# Patient Record
Sex: FEMALE | Race: ASIAN | NOT HISPANIC OR LATINO | URBAN - METROPOLITAN AREA
[De-identification: names, ages, dates, MRNs, and addresses within clinical notes are randomized per-mention and may not be internally consistent; named-entity substitution may affect disease eponyms.]

---

## 2019-11-25 ENCOUNTER — EMERGENCY (EMERGENCY)
Facility: HOSPITAL | Age: 49
LOS: 1 days | Discharge: HOME | End: 2019-11-25
Attending: EMERGENCY MEDICINE | Admitting: EMERGENCY MEDICINE
Payer: MEDICAID

## 2019-11-25 VITALS
HEART RATE: 71 BPM | DIASTOLIC BLOOD PRESSURE: 76 MMHG | SYSTOLIC BLOOD PRESSURE: 127 MMHG | TEMPERATURE: 98 F | WEIGHT: 119.05 LBS | OXYGEN SATURATION: 100 % | RESPIRATION RATE: 16 BRPM

## 2019-11-25 PROCEDURE — 99284 EMERGENCY DEPT VISIT MOD MDM: CPT

## 2019-11-25 NOTE — ED ADULT TRIAGE NOTE - CHIEF COMPLAINT QUOTE
Ambulatory from home, Turkish speaking, sister Maggy acting as , complaining of CP that started yesterday, worse on inspiration. Patient thought that it was gas related yesterday and took a family member's omeprazole and some motrin without any relief. Patient went to  today and was told to come to the ED without getting an EKG or blood work. VSS. EKG in progress. Denies long car rides/air travel, PMH.

## 2019-11-26 VITALS
SYSTOLIC BLOOD PRESSURE: 102 MMHG | RESPIRATION RATE: 18 BRPM | HEART RATE: 62 BPM | OXYGEN SATURATION: 100 % | DIASTOLIC BLOOD PRESSURE: 71 MMHG | TEMPERATURE: 98 F

## 2019-11-26 DIAGNOSIS — Z90.49 ACQUIRED ABSENCE OF OTHER SPECIFIED PARTS OF DIGESTIVE TRACT: Chronic | ICD-10-CM

## 2019-11-26 LAB
ALBUMIN SERPL ELPH-MCNC: 4.1 G/DL — SIGNIFICANT CHANGE UP (ref 3.3–5)
ALP SERPL-CCNC: 86 U/L — SIGNIFICANT CHANGE UP (ref 40–120)
ALT FLD-CCNC: 20 U/L — SIGNIFICANT CHANGE UP (ref 4–33)
ANION GAP SERPL CALC-SCNC: 10 MMO/L — SIGNIFICANT CHANGE UP (ref 7–14)
APPEARANCE UR: CLEAR — SIGNIFICANT CHANGE UP
AST SERPL-CCNC: 17 U/L — SIGNIFICANT CHANGE UP (ref 4–32)
BASOPHILS # BLD AUTO: 0.1 K/UL — SIGNIFICANT CHANGE UP (ref 0–0.2)
BASOPHILS NFR BLD AUTO: 1 % — SIGNIFICANT CHANGE UP (ref 0–2)
BILIRUB SERPL-MCNC: < 0.2 MG/DL — LOW (ref 0.2–1.2)
BILIRUB UR-MCNC: NEGATIVE — SIGNIFICANT CHANGE UP
BLOOD UR QL VISUAL: NEGATIVE — SIGNIFICANT CHANGE UP
BUN SERPL-MCNC: 10 MG/DL — SIGNIFICANT CHANGE UP (ref 7–23)
CALCIUM SERPL-MCNC: 9.3 MG/DL — SIGNIFICANT CHANGE UP (ref 8.4–10.5)
CHLORIDE SERPL-SCNC: 105 MMOL/L — SIGNIFICANT CHANGE UP (ref 98–107)
CO2 SERPL-SCNC: 24 MMOL/L — SIGNIFICANT CHANGE UP (ref 22–31)
COLOR SPEC: SIGNIFICANT CHANGE UP
CREAT SERPL-MCNC: 0.74 MG/DL — SIGNIFICANT CHANGE UP (ref 0.5–1.3)
D DIMER BLD IA.RAPID-MCNC: < 150 NG/ML — SIGNIFICANT CHANGE UP
EOSINOPHIL # BLD AUTO: 1.47 K/UL — HIGH (ref 0–0.5)
EOSINOPHIL NFR BLD AUTO: 14.4 % — HIGH (ref 0–6)
GLUCOSE SERPL-MCNC: 100 MG/DL — HIGH (ref 70–99)
GLUCOSE UR-MCNC: NEGATIVE — SIGNIFICANT CHANGE UP
HCT VFR BLD CALC: 36.6 % — SIGNIFICANT CHANGE UP (ref 34.5–45)
HGB BLD-MCNC: 11.4 G/DL — LOW (ref 11.5–15.5)
IMM GRANULOCYTES NFR BLD AUTO: 0.1 % — SIGNIFICANT CHANGE UP (ref 0–1.5)
KETONES UR-MCNC: NEGATIVE — SIGNIFICANT CHANGE UP
LEUKOCYTE ESTERASE UR-ACNC: NEGATIVE — SIGNIFICANT CHANGE UP
LYMPHOCYTES # BLD AUTO: 3.44 K/UL — HIGH (ref 1–3.3)
LYMPHOCYTES # BLD AUTO: 33.6 % — SIGNIFICANT CHANGE UP (ref 13–44)
MCHC RBC-ENTMCNC: 26.4 PG — LOW (ref 27–34)
MCHC RBC-ENTMCNC: 31.1 % — LOW (ref 32–36)
MCV RBC AUTO: 84.7 FL — SIGNIFICANT CHANGE UP (ref 80–100)
MONOCYTES # BLD AUTO: 0.87 K/UL — SIGNIFICANT CHANGE UP (ref 0–0.9)
MONOCYTES NFR BLD AUTO: 8.5 % — SIGNIFICANT CHANGE UP (ref 2–14)
NEUTROPHILS # BLD AUTO: 4.34 K/UL — SIGNIFICANT CHANGE UP (ref 1.8–7.4)
NEUTROPHILS NFR BLD AUTO: 42.4 % — LOW (ref 43–77)
NITRITE UR-MCNC: NEGATIVE — SIGNIFICANT CHANGE UP
NRBC # FLD: 0 K/UL — SIGNIFICANT CHANGE UP (ref 0–0)
NT-PROBNP SERPL-SCNC: 41.48 PG/ML — SIGNIFICANT CHANGE UP
PH UR: 6.5 — SIGNIFICANT CHANGE UP (ref 5–8)
PLATELET # BLD AUTO: 362 K/UL — SIGNIFICANT CHANGE UP (ref 150–400)
PMV BLD: 10.4 FL — SIGNIFICANT CHANGE UP (ref 7–13)
POTASSIUM SERPL-MCNC: 4.2 MMOL/L — SIGNIFICANT CHANGE UP (ref 3.5–5.3)
POTASSIUM SERPL-SCNC: 4.2 MMOL/L — SIGNIFICANT CHANGE UP (ref 3.5–5.3)
PROT SERPL-MCNC: 7.4 G/DL — SIGNIFICANT CHANGE UP (ref 6–8.3)
PROT UR-MCNC: NEGATIVE — SIGNIFICANT CHANGE UP
RBC # BLD: 4.32 M/UL — SIGNIFICANT CHANGE UP (ref 3.8–5.2)
RBC # FLD: 13 % — SIGNIFICANT CHANGE UP (ref 10.3–14.5)
SODIUM SERPL-SCNC: 139 MMOL/L — SIGNIFICANT CHANGE UP (ref 135–145)
SP GR SPEC: 1.01 — SIGNIFICANT CHANGE UP (ref 1–1.04)
TROPONIN T, HIGH SENSITIVITY: < 6 NG/L — SIGNIFICANT CHANGE UP (ref ?–14)
UROBILINOGEN FLD QL: NORMAL — SIGNIFICANT CHANGE UP
WBC # BLD: 10.23 K/UL — SIGNIFICANT CHANGE UP (ref 3.8–10.5)
WBC # FLD AUTO: 10.23 K/UL — SIGNIFICANT CHANGE UP (ref 3.8–10.5)

## 2019-11-26 PROCEDURE — 71046 X-RAY EXAM CHEST 2 VIEWS: CPT | Mod: 26

## 2019-11-26 RX ORDER — IBUPROFEN 200 MG
400 TABLET ORAL ONCE
Refills: 0 | Status: COMPLETED | OUTPATIENT
Start: 2019-11-26 | End: 2019-11-26

## 2019-11-26 RX ORDER — ASPIRIN/CALCIUM CARB/MAGNESIUM 324 MG
324 TABLET ORAL ONCE
Refills: 0 | Status: COMPLETED | OUTPATIENT
Start: 2019-11-26 | End: 2019-11-26

## 2019-11-26 RX ORDER — ASPIRIN/CALCIUM CARB/MAGNESIUM 324 MG
324 TABLET ORAL DAILY
Refills: 0 | Status: DISCONTINUED | OUTPATIENT
Start: 2019-11-26 | End: 2019-11-26

## 2019-11-26 RX ADMIN — Medication 324 MILLIGRAM(S): at 02:38

## 2019-11-26 RX ADMIN — Medication 400 MILLIGRAM(S): at 04:29

## 2019-11-26 NOTE — ED PROVIDER NOTE - PATIENT PORTAL LINK FT
You can access the FollowMyHealth Patient Portal offered by [facility] by registering at the following website: [email or web address]. By joining [employer]’s FollowMyHealth portal, you will also be able to view your health information using other applications (apps) compatible with our system.

## 2019-11-26 NOTE — ED PROVIDER NOTE - OBJECTIVE STATEMENT
49y/o F with no known PMH presents for CP since night prior. Is mid/L sided chest w/o radiation, no diaphoresis/lightheadness, never had this pain before. Thought it was gas pain so took some meds but this didn't improve. Persisting today so presented to ED. Worse with deep breaths and twisting movement, not a/w SOB, fever, cough/cold sxs. No recent travel[place], sees her PCP regularly.

## 2019-11-26 NOTE — ED PROVIDER NOTE - CLINICAL SUMMARY MEDICAL DECISION MAKING FREE TEXT BOX
49y/o Wolof F with no PMH presents for chest wall pain that is reproducible and pleuritic. Differential r/o ACS vs infectious cause vs MSK/costochondritis. Will get labs, EKG, CXR, trop. Likely Dispo home if eval neg 47y/o Czech F with no PMH presents for chest wall pain that is reproducible and pleuritic. Differential r/o ACS vs infectious cause vs MSK/costochondritis. Will get labs, EKG, CXR, trop, D-dimer. HEART 0, Wells 0, PERC 1 for age Likely Dispo home if eval neg

## 2019-11-26 NOTE — ED ADULT NURSE NOTE - OBJECTIVE STATEMENT
Facilitator RN - Pt. received in room 8, A&Ox4, ambulatory. Pt. primarily Estonian speaking, declines translation services at present and requesting family at bedside to translate. Pt. c/o midsternal/left sided chest pain that began last night. Worsening when taking a deep breath. Denies radiation of pain to other areas, n/v/d, abd pain, dizziness, weakness, fever, chills. 20 gauge IV inserted in right ac, positive blood return, flushes without difficulty. Respirations even & unlabored on room air. Placed on cardiac monitor. Report given to primary RN Rosa Maria.

## 2019-11-26 NOTE — ED PROVIDER NOTE - PHYSICAL EXAMINATION
PHYSICAL EXAM:  GENERAL: NAD, well-developed  F comfortable but sitting upright in stretcher  HEENT:  Atraumatic, Normocephalic; EOMI, PERRLA, conjunctiva pink, sclera white, Supple, No JVD appreciated  CHEST/LUNG: Clear to auscultation bilaterally; No wheeze  HEART: Regular rate and rhythm; No murmurs; chest pain reproduced on palpation over L sternal border and chest wall  ABDOMEN: Soft, Nontender, Nondistended; Bowel sounds present  EXTREMITIES:  No peripheral pitting edema  PSYCH: AAOx3  SKIN: No rashes/lesions appreciated

## 2019-11-26 NOTE — ED ADULT NURSE NOTE - CHIEF COMPLAINT QUOTE
Ambulatory from home, Slovenian speaking, sister Maggy acting as , complaining of CP that started yesterday, worse on inspiration. Patient thought that it was gas related yesterday and took a family member's omeprazole and some motrin without any relief. Patient went to  today and was told to come to the ED without getting an EKG or blood work. VSS. EKG in progress. Denies long car rides/air travel, PMH.

## 2019-11-26 NOTE — ED PROVIDER NOTE - ATTENDING CONTRIBUTION TO CARE
DR. TROY, ATTENDING MD-  I performed a face to face bedside interview with the patient regarding history of present illness, review of symptoms and past medical history. I completed an independent physical exam.  I have discussed the patient's plan of care with the resident.   Documentation as above in the note.    47 y/o female with cp.  Denies f/c, ha, neck stiffness, sob, cough, abd pain, n/v/d, dysuria, rash.  Afebrile, vs wnl, nad, ctabil, s1s2 rrr no m/r/g, abd soft non ttp no r/g, no cva tenderness b/l, no leg swelling b/l, no rash.  Pre-trop HEART score 1 for hx.  Low risk by wells, cannot perc out d/t age.  Obtain cbc, bmp, trop, dimer, cxr, ekg, reassess, antic dc with cards f/u as o/p.

## 2019-11-26 NOTE — ED PROVIDER NOTE - NSFOLLOWUPINSTRUCTIONS_ED_ALL_ED_FT
You presented with chest pain that was worse with movement and pressing over area. You had a workup for cardiac cause that was negative. Xray of chest did not show a reason for your pain. The pain may be from muscles of chest wall or gas pain from stomach.     1) Recommend tylenol/motrin or similar medications for pain  2) If pain worsens, spreads, you feel weak or lightheaded seek immediate medical care  3) Follow up with your primary care doctor on discharge.

## 2020-01-07 ENCOUNTER — EMERGENCY (EMERGENCY)
Facility: HOSPITAL | Age: 50
LOS: 1 days | Discharge: HOME | End: 2020-01-07
Attending: EMERGENCY MEDICINE | Admitting: EMERGENCY MEDICINE
Payer: MEDICAID

## 2020-01-07 VITALS
DIASTOLIC BLOOD PRESSURE: 82 MMHG | HEART RATE: 76 BPM | OXYGEN SATURATION: 100 % | TEMPERATURE: 100 F | RESPIRATION RATE: 16 BRPM | SYSTOLIC BLOOD PRESSURE: 140 MMHG

## 2020-01-07 DIAGNOSIS — Z90.49 ACQUIRED ABSENCE OF OTHER SPECIFIED PARTS OF DIGESTIVE TRACT: Chronic | ICD-10-CM

## 2020-01-07 PROCEDURE — 12001 RPR S/N/AX/GEN/TRNK 2.5CM/<: CPT | Mod: F6

## 2020-01-07 PROCEDURE — 99282 EMERGENCY DEPT VISIT SF MDM: CPT | Mod: 25

## 2020-01-07 RX ORDER — TETANUS TOXOID, REDUCED DIPHTHERIA TOXOID AND ACELLULAR PERTUSSIS VACCINE, ADSORBED 5; 2.5; 8; 8; 2.5 [IU]/.5ML; [IU]/.5ML; UG/.5ML; UG/.5ML; UG/.5ML
0.5 SUSPENSION INTRAMUSCULAR ONCE
Refills: 0 | Status: COMPLETED | OUTPATIENT
Start: 2020-01-07 | End: 2020-01-07

## 2020-01-07 RX ADMIN — TETANUS TOXOID, REDUCED DIPHTHERIA TOXOID AND ACELLULAR PERTUSSIS VACCINE, ADSORBED 0.5 MILLILITER(S): 5; 2.5; 8; 8; 2.5 SUSPENSION INTRAMUSCULAR at 22:46

## 2020-01-07 NOTE — ED PROVIDER NOTE - OBJECTIVE STATEMENT
48 y/o f presents to the ed with laceration to finger. Patient was using a knife, missed and sliced across her finger instead (2nd r. digit). Bled at time, put pressure on it and came here. No numbness, tingling or weakness to finger. No difficulty ranging at joints. No fevers, chills, ha, cp, cough, sob, abd pain, vomiting, diarrhea, dysuria.

## 2020-01-07 NOTE — ED PROVIDER NOTE - NS ED ROS FT
ROS:  GENERAL: No fever, no chills  EYES: no change in vision  HEENT: no trouble swallowing, no trouble speaking  CARDIAC: no chest pain  PULMONARY: no cough, no shortness of breath  GI: no abdominal pain, no nausea, no vomiting, no diarrhea, no constipation  : No dysuria, no frequency, no change in appearance, or odor of urine  SKIN: +laceration, r. 2nd digit  NEURO: no headache, no weakness  MSK: No joint pain

## 2020-01-07 NOTE — ED PROVIDER NOTE - CLINICAL SUMMARY MEDICAL DECISION MAKING FREE TEXT BOX
Patient presents to the ed with lac to 2nd r. digit, nvi, no other complaints, not utd on tetanus-will irrigate, repair, adacel, return precautions discussed.

## 2020-01-07 NOTE — ED ADULT TRIAGE NOTE - CHIEF COMPLAINT QUOTE
alert Japanese speaking refused  daughter will translate no distress has laceration on right index finger   cut it with a knife  no medical hx

## 2020-01-07 NOTE — ED PROVIDER NOTE - PHYSICAL EXAMINATION
A & O x 3, NAD, HEENT WNL and no facial asymmetry; lungs CTAB, heart with reg rhythm without murmur; abdomen soft NTND; extremities with no edema; skin with 1 cm laceration to medial aspect of proximal 2nd digit, no fb visualized, cap refill <2s, nvi, from at joints. neuro exam non focal with no motor or sensory deficits.

## 2020-01-07 NOTE — ED PROVIDER NOTE - PATIENT PORTAL LINK FT
You can access the FollowMyHealth Patient Portal offered by [facility] by registering at the following website: [email or web address]. By joining [employer]’s FollowMyHealth portal, you will also be able to view your health information using other applications (apps) compatible with our system.

## 2020-01-07 NOTE — ED ADULT NURSE NOTE - OBJECTIVE STATEMENT
PT received to the ED presenting with laceration on 2nd digit on rt hand. Pt reports cutting finger with knife while cooking, laceration performed by JASS Bazzi, will continue to monitor.

## 2020-01-07 NOTE — ED ADULT NURSE NOTE - CHIEF COMPLAINT QUOTE
alert Pashto speaking refused  daughter will translate no distress has laceration on right index finger   cut it with a knife  no medical hx

## 2020-01-07 NOTE — ED PROVIDER NOTE - NSFOLLOWUPINSTRUCTIONS_ED_ALL_ED_FT
Follow up with your PMD within 48-72 hours for wound check. Keep sutures covered and dry for 24 hours then clean with soap and water daily.  Apply bacitracin and cover. Avoid heavy lifting. Return to ED for suture removal 8-10 days. Any increased pain, redness, streaking (red lines), swelling, fever, chills return to ER.

## 2020-01-07 NOTE — ED PROVIDER NOTE - PROGRESS NOTE DETAILS
JASS PATEL: pt signed out to me by Dr. Patiño for laceration repair. Laceration repaired. Pt is medically stable for discharge and follow up with PMD. The patient was given verbal and written discharge instructions. Specifically, instructions when to return to the ED and when to seek follow-up from their pcp was discussed. Any specialty follow-up was discussed, including how to make an appointment.  Instructions were discussed in simple, plain language and was understood by the patient. The patient understands that should their symptoms worsen or any new symptoms arise, they should return to the ED immediately for further evaluation. All pt's questions were answered. Patient verbalizes understanding.

## 2020-01-18 ENCOUNTER — EMERGENCY (EMERGENCY)
Facility: HOSPITAL | Age: 50
LOS: 1 days | Discharge: HOME | End: 2020-01-18
Attending: EMERGENCY MEDICINE | Admitting: EMERGENCY MEDICINE
Payer: MEDICAID

## 2020-01-18 VITALS
SYSTOLIC BLOOD PRESSURE: 129 MMHG | DIASTOLIC BLOOD PRESSURE: 72 MMHG | HEART RATE: 68 BPM | TEMPERATURE: 98 F | RESPIRATION RATE: 16 BRPM | OXYGEN SATURATION: 100 %

## 2020-01-18 DIAGNOSIS — Z90.49 ACQUIRED ABSENCE OF OTHER SPECIFIED PARTS OF DIGESTIVE TRACT: Chronic | ICD-10-CM

## 2020-01-18 PROCEDURE — 73140 X-RAY EXAM OF FINGER(S): CPT | Mod: 26,RT

## 2020-01-18 PROCEDURE — 99283 EMERGENCY DEPT VISIT LOW MDM: CPT

## 2020-01-18 RX ORDER — CEPHALEXIN 500 MG
1 CAPSULE ORAL
Qty: 28 | Refills: 0
Start: 2020-01-18 | End: 2020-01-24

## 2020-01-18 RX ORDER — IBUPROFEN 200 MG
600 TABLET ORAL ONCE
Refills: 0 | Status: COMPLETED | OUTPATIENT
Start: 2020-01-18 | End: 2020-01-18

## 2020-01-18 RX ADMIN — Medication 600 MILLIGRAM(S): at 22:36

## 2020-01-18 NOTE — ED PROVIDER NOTE - CLINICAL SUMMARY MEDICAL DECISION MAKING FREE TEXT BOX
This is a 49 yr old F, with no pmh for removing sutrures to right index finger. Pt cut her finger 11 days ago with a can. Suture site swollen slightly red, and tender to touch. After removal of suture, wound did not heal well. No discharge noted. Denies any foul odor, nausea, dizziness. PAin management, motrin and keflex for infection. Xray of right index finger r/o any bone infection. This is a 49 yr old F, with no pmh for removing sutrures to right index finger. Pt cut her finger 11 days ago with a can. Suture site swollen slightly red, and tender to touch. After removal of suture, wound did not heal well. No discharge noted. Denies any foul odor, nausea, dizziness. PAin management, motrin and keflex for infection. Xray of right index finger r/o any bone infection.- preliminary report by ED attending is unremarkable, pt will be discharge and follow up with ortho

## 2020-01-18 NOTE — ED PROVIDER NOTE - PATIENT PORTAL LINK FT
You can access the FollowMyHealth Patient Portal offered by [facility] by registering at the following website: [email or web address]. By joining [employer]’s FollowMyHealth portal, you will also be able to view your health information using other applications (apps) compatible with our system.

## 2020-01-18 NOTE — ED PROVIDER NOTE - NS ED ROS FT
ROS  	•	CONSTITUTIONAL - no fever, no diaphoresis, no weight change  	•	SKIN - + wound right index finger, slightly redden and swollen, painful  	•	HEMATOLOGIC - no bleeding, no bruising  	•	EYES - no eye pain, no blurred vision  	•	ENT - no change in hearing, no pain  	•	RESPIRATORY - no shortness of breath, no cough  	•	CARDIAC - no chest pain, no palpitations  	•	GI - no abd pain, no nausea, no vomiting, no diarrhea, no constipation, no bleeding  	•	GENITO-URINARY - no frequent urination, no urgency, no dysuria; no hematuria,    	•	ENDO - no polydypsia, no polyurea, no heat/no cold intolerance  	•	MUSCULOSKELETAL - no joint paint,   	•	NEUROLOGIC - no weakness, no headache, no anesthesia, no paresthesias

## 2020-01-18 NOTE — ED PROVIDER NOTE - ATTENDING CONTRIBUTION TO CARE
Dr. Ochoa:  I performed a face to face bedside interview with patient regarding history of present illness, review of symptoms and past medical history. I completed an independent physical exam.  I have discussed patient's plan of care with NP.  I agree with note as stated above, having amended the EMR as needed to reflect my findings.   This includes HISTORY OF PRESENT ILLNESS, HIV, PAST MEDICAL/SURGICAL/FAMILY/SOCIAL HISTORY, ALLERGIES AND HOME MEDICATIONS, REVIEW OF SYSTEMS, PHYSICAL EXAM, and any PROGRESS NOTES during the time I functioned as the attending physician for this patient.      49F presents for suture removal.  Sustained laceration to right index finger 11 days ago.    Exam:  - laceration to palmar side of right index finger, +mild swelling and erythema of area and laceration still partially open    A/P  - sutures removed, will give abx for presumed mild cellulitis and leave wound open for closure by delayed secondary intention

## 2020-01-18 NOTE — ED ADULT TRIAGE NOTE - CHIEF COMPLAINT QUOTE
pt arrives for stitches removal. pt states here 11 days ago due to cutting index finger. pt denies any redness or drainage. pt states some swelling.

## 2020-01-18 NOTE — ED PROVIDER NOTE - NSFOLLOWUPCLINICS_GEN_ALL_ED_FT
[facility]  Orthopedic Surgery  [address], 3rd & 4th [place] [address]  Phone: [phone]  Fax:   Follow Up Time:     [facility]  Orthopedics  [address] [place] [address]  Phone: [phone]  Fax: [phone]  Follow Up Time:     [facility]  Orthopedic Surgery  [address] [place] [address]  Phone: [phone]  Fax:   Follow Up Time:

## 2020-01-18 NOTE — ED PROVIDER NOTE - NSFOLLOWUPINSTRUCTIONS_ED_ALL_ED_FT
TAKE  medication KEFLEX ANTIBIOTIC AS PRESCRIBED FOR Finger Abscess    MAKE APPOINTMENT WITH ORTHO AND FOLLOW UP REGARDING YOUR RIGHT INDEX FINGER    FOLLOW-UP WITH YOUR PRIMARY CARE DOCTOR IN 1-2 DAYS TO DISCUSS YOUR ER APPOINTMENT. CALL OFFICE FOR APPOINTMENT.    IF NEEDED, CALL PATIENT ACCESS SERVICES AT [phone] TO FIND A PRIMARY CARE PHYSICIAN.  OR CALL [phone] TO MAKE AN APPOINTMENT WITH THE CLINIC.     RETURN TO THE ER FOR ANY WORSENING SYMPTOMS OR CONCERNS.

## 2020-01-18 NOTE — ED PROVIDER NOTE - OBJECTIVE STATEMENT
This is a 49 yr old F, with no pmh for removing sutrures to right index finger. Pt cut her finger 11 days ago with a can. Suture site swollem slightly red, and tender to touch. After removal of suture, wound did not heal well. No discharge noted. Denies any foul odor, nausea, dizziness.

## 2023-06-01 NOTE — ED ADULT TRIAGE NOTE - LOCATION:
Left arm; [FreeTextEntry1] : -Warm compress, steam shower, c/w humidifier\par -Antihistamine\par -Start doxycycline and low dose prednisone (has taken steroids in the past)\par She has a colonoscopy scheduled next week, will call GI to confirm okay to take steroids\par -If no improvement advised to seek ENT consult

## 2023-08-31 NOTE — ED ADULT TRIAGE NOTE - SOURCE OF INFORMATION
Patient arrives with a chief complaint of left wrist pain after slipping and falling outside.    Prescription already sent to pharmacy.   Patient

## 2025-03-10 NOTE — ED ADULT TRIAGE NOTE - CADM TRG TX PRIOR TO ARRIVAL
Labs sent at this time. Medicated per orders  
Updated on POC. Call light in reach. Even and unlabored respirations.   
none

## 2025-06-25 ENCOUNTER — EMERGENCY (EMERGENCY)
Facility: HOSPITAL | Age: 55
LOS: 0 days | Discharge: HOME | End: 2025-06-25
Attending: EMERGENCY MEDICINE
Payer: MEDICAID

## 2025-06-25 VITALS
TEMPERATURE: 98 F | HEART RATE: 57 BPM | DIASTOLIC BLOOD PRESSURE: 79 MMHG | RESPIRATION RATE: 16 BRPM | SYSTOLIC BLOOD PRESSURE: 126 MMHG | OXYGEN SATURATION: 100 %

## 2025-06-25 VITALS — HEART RATE: 79 BPM | HEIGHT: 58 IN | WEIGHT: 115.08 LBS | OXYGEN SATURATION: 98 %

## 2025-06-25 DIAGNOSIS — R10.30 LOWER ABDOMINAL PAIN, UNSPECIFIED: ICD-10-CM

## 2025-06-25 DIAGNOSIS — R53.1 WEAKNESS: ICD-10-CM

## 2025-06-25 DIAGNOSIS — Z90.49 ACQUIRED ABSENCE OF OTHER SPECIFIED PARTS OF DIGESTIVE TRACT: Chronic | ICD-10-CM

## 2025-06-25 DIAGNOSIS — R07.89 OTHER CHEST PAIN: ICD-10-CM

## 2025-06-25 DIAGNOSIS — H66.91 OTITIS MEDIA, UNSPECIFIED, RIGHT EAR: ICD-10-CM

## 2025-06-25 DIAGNOSIS — H60.91 UNSPECIFIED OTITIS EXTERNA, RIGHT EAR: ICD-10-CM

## 2025-06-25 PROBLEM — Z78.9 OTHER SPECIFIED HEALTH STATUS: Chronic | Status: ACTIVE | Noted: 2020-01-18

## 2025-06-25 LAB
ALBUMIN SERPL ELPH-MCNC: 3.6 G/DL — SIGNIFICANT CHANGE UP (ref 3.3–5)
ALP SERPL-CCNC: 127 U/L — HIGH (ref 40–120)
ALT FLD-CCNC: 41 U/L — SIGNIFICANT CHANGE UP (ref 12–78)
ANION GAP SERPL CALC-SCNC: 4 MMOL/L — LOW (ref 5–17)
AST SERPL-CCNC: 21 U/L — SIGNIFICANT CHANGE UP (ref 15–37)
BASOPHILS # BLD AUTO: 0.06 K/UL — SIGNIFICANT CHANGE UP (ref 0–0.2)
BASOPHILS NFR BLD AUTO: 0.6 % — SIGNIFICANT CHANGE UP (ref 0–2)
BILIRUB SERPL-MCNC: 0.2 MG/DL — SIGNIFICANT CHANGE UP (ref 0.2–1.2)
BUN SERPL-MCNC: 12 MG/DL — SIGNIFICANT CHANGE UP (ref 7–23)
CALCIUM SERPL-MCNC: 8.9 MG/DL — SIGNIFICANT CHANGE UP (ref 8.5–10.1)
CHLORIDE SERPL-SCNC: 110 MMOL/L — HIGH (ref 96–108)
CO2 SERPL-SCNC: 27 MMOL/L — SIGNIFICANT CHANGE UP (ref 22–31)
CREAT SERPL-MCNC: 0.85 MG/DL — SIGNIFICANT CHANGE UP (ref 0.5–1.3)
EGFR: 80 ML/MIN/1.73M2 — SIGNIFICANT CHANGE UP
EGFR: 80 ML/MIN/1.73M2 — SIGNIFICANT CHANGE UP
EOSINOPHIL # BLD AUTO: 0.37 K/UL — SIGNIFICANT CHANGE UP (ref 0–0.5)
EOSINOPHIL NFR BLD AUTO: 3.9 % — SIGNIFICANT CHANGE UP (ref 0–6)
GLUCOSE SERPL-MCNC: 87 MG/DL — SIGNIFICANT CHANGE UP (ref 70–99)
HCT VFR BLD CALC: 35.6 % — SIGNIFICANT CHANGE UP (ref 34.5–45)
HGB BLD-MCNC: 11.3 G/DL — LOW (ref 11.5–15.5)
IMM GRANULOCYTES NFR BLD AUTO: 0.2 % — SIGNIFICANT CHANGE UP (ref 0–0.9)
LIDOCAIN IGE QN: 32 U/L — SIGNIFICANT CHANGE UP (ref 13–75)
LYMPHOCYTES # BLD AUTO: 2.43 K/UL — SIGNIFICANT CHANGE UP (ref 1–3.3)
LYMPHOCYTES # BLD AUTO: 25.4 % — SIGNIFICANT CHANGE UP (ref 13–44)
MCHC RBC-ENTMCNC: 26.8 PG — LOW (ref 27–34)
MCHC RBC-ENTMCNC: 31.7 G/DL — LOW (ref 32–36)
MCV RBC AUTO: 84.4 FL — SIGNIFICANT CHANGE UP (ref 80–100)
MONOCYTES # BLD AUTO: 0.79 K/UL — SIGNIFICANT CHANGE UP (ref 0–0.9)
MONOCYTES NFR BLD AUTO: 8.3 % — SIGNIFICANT CHANGE UP (ref 2–14)
NEUTROPHILS # BLD AUTO: 5.89 K/UL — SIGNIFICANT CHANGE UP (ref 1.8–7.4)
NEUTROPHILS NFR BLD AUTO: 61.6 % — SIGNIFICANT CHANGE UP (ref 43–77)
NRBC BLD AUTO-RTO: 0 /100 WBCS — SIGNIFICANT CHANGE UP (ref 0–0)
PLATELET # BLD AUTO: 291 K/UL — SIGNIFICANT CHANGE UP (ref 150–400)
POTASSIUM SERPL-MCNC: 3.6 MMOL/L — SIGNIFICANT CHANGE UP (ref 3.5–5.3)
POTASSIUM SERPL-SCNC: 3.6 MMOL/L — SIGNIFICANT CHANGE UP (ref 3.5–5.3)
PROT SERPL-MCNC: 7.7 GM/DL — SIGNIFICANT CHANGE UP (ref 6–8.3)
RBC # BLD: 4.22 M/UL — SIGNIFICANT CHANGE UP (ref 3.8–5.2)
RBC # FLD: 13.2 % — SIGNIFICANT CHANGE UP (ref 10.3–14.5)
SODIUM SERPL-SCNC: 141 MMOL/L — SIGNIFICANT CHANGE UP (ref 135–145)
TROPONIN I, HIGH SENSITIVITY RESULT: 6.3 NG/L — SIGNIFICANT CHANGE UP
WBC # BLD: 9.56 K/UL — SIGNIFICANT CHANGE UP (ref 3.8–10.5)
WBC # FLD AUTO: 9.56 K/UL — SIGNIFICANT CHANGE UP (ref 3.8–10.5)

## 2025-06-25 PROCEDURE — 99285 EMERGENCY DEPT VISIT HI MDM: CPT

## 2025-06-25 PROCEDURE — 71045 X-RAY EXAM CHEST 1 VIEW: CPT | Mod: 26

## 2025-06-25 PROCEDURE — 93010 ELECTROCARDIOGRAM REPORT: CPT

## 2025-06-25 RX ORDER — AMOXICILLIN 500 MG/1
1 CAPSULE ORAL
Qty: 14 | Refills: 0
Start: 2025-06-25 | End: 2025-07-01

## 2025-06-25 RX ORDER — OFLOXACIN 0.3 %
5 DROPS OTIC (EAR)
Qty: 1 | Refills: 0
Start: 2025-06-25 | End: 2025-07-01

## 2025-06-25 RX ORDER — ACETAMINOPHEN 500 MG/5ML
975 LIQUID (ML) ORAL ONCE
Refills: 0 | Status: COMPLETED | OUTPATIENT
Start: 2025-06-25 | End: 2025-06-25

## 2025-06-25 RX ADMIN — Medication 975 MILLIGRAM(S): at 15:23

## 2025-06-25 NOTE — ED ADULT NURSE NOTE - OBJECTIVE STATEMENT
54 year old female with no PMH. Pt offered translation service but refused stating daughter will translate.  Conversation  translated daughter David Tijerina. Pt wityh c/o  Right ear pain x 5 days, general weakness, upper epigastric pain started x 3 days, lower abd pain started today. Pt denies fevers chills and n/V

## 2025-06-25 NOTE — ED PROVIDER NOTE - PATIENT PORTAL LINK FT
You can access the FollowMyHealth Patient Portal offered by [facility] by registering at the following website: [email or web address]. By joining [employer]’s FollowMyHealth portal, you will also be able to view your health information using other applications (apps) compatible with our system.

## 2025-06-25 NOTE — ED PROVIDER NOTE - ATTENDING APP SHARED VISIT CONTRIBUTION OF CARE
Attending note (Black): Patient seen and evaluated initially by myself, with the my care plan instituted by the advanced care practitioner as detailed above in the medical decision making section. See MDM or progress notes sections for updates to this care plan.  I have reviewed and agree with any additional documentation by JASPER Gramajo.  Patient presenting with chest pain; ECG non ischemic and cxr clear lungs; troponin not elevated; low suspicion primary cardiac; see mdm above.  Also with ear pain, PE consistent with otitis external - I agree with the discharge plan as documented above.

## 2025-06-25 NOTE — ED PROVIDER NOTE - NSFOLLOWUPINSTRUCTIONS_ED_ALL_ED_FT
- You were seen in the ER today for chest pain and ear pain.    - Blood work, ekg, and chest xray was performed which were within normal limits, please see attached results below.    - A prescription was sent to your pharmacy for Amoxicillin and Ofloxacin ear drops for ear infection, please take as directed.    - Please follow up with your primary care doctor in the next 48-72 hours, bring a copy of your results.     - Please follow-up with a cardiologist, contact information will be provided below for you.    - Patient return to the ER for severe chest pain, pain radiating to the arm, jaw, or back, nausea or uncontrolled vomiting, difficulty breathing, palpitations, passing out, redness spreading from ear to jaw or face, fevers, change in behavior or mental status, or any other concerns.      Nonspecific Chest Pain    Chest pain can be caused by many different conditions. Some causes of chest pain can be life-threatening. These will require treatment right away. Serious causes of chest pain include:  Heart attack.  A tear in the body's main blood vessel.  Redness and swelling (inflammation) around your heart.  Blood clot in your lungs.  Other causes of chest pain may not be so serious. These include:  Heartburn.  Anxiety or stress.  Damage to bones or muscles in your chest.  Lung infections.  Chest pain can feel like:  Pain or discomfort in your chest.  Crushing, pressure, aching, or squeezing pain.  Burning or tingling.  Dull or sharp pain that is worse when you move, cough, or take a deep breath.  Pain or discomfort that is also felt in your back, neck, jaw, shoulder, or arm, or pain that spreads to any of these areas.  It is hard to know whether your pain is caused by something that is serious or something that is not so serious. So it is important to see your doctor right away if you have chest pain.    Follow these instructions at home:    Medicines  Take over-the-counter and prescription medicines only as told by your doctor.  If you were prescribed an antibiotic medicine, take it as told by your doctor. Do not stop taking the antibiotic even if you start to feel better.    Lifestyle  A plate along with examples of foods in a healthy diet.  Rest as told by your doctor.  Do not use any products that contain nicotine or tobacco, such as cigarettes, e-cigarettes, and chewing tobacco. If you need help quitting, ask your doctor.  Do not drink alcohol.  Make lifestyle changes as told by your doctor. These may include:  Getting regular exercise. Ask your doctor what activities are safe for you.  Eating a heart-healthy diet. A diet and nutrition specialist (dietitian) can help you to learn healthy eating options.  Staying at a healthy weight.  Treating diabetes or high blood pressure, if needed.  Lowering your stress. Activities such as yoga and relaxation techniques can help.    General instructions  Pay attention to any changes in your symptoms. Tell your doctor about them or any new symptoms.  Avoid any activities that cause chest pain.  Keep all follow-up visits as told by your doctor. This is important. You may need more testing if your chest pain does not go away.    Contact a doctor if:  Your chest pain does not go away.  You feel depressed.  You have a fever.    GET HELP RIGHT AWAY IF:  Your chest pain is worse.  You have a cough that gets worse, or you cough up blood.  You have very bad (severe) pain in your belly (abdomen).  You pass out (faint).  You have either of these for no clear reason:  Sudden chest discomfort.  Sudden discomfort in your arms, back, neck, or jaw.  You have shortness of breath at any time.  You suddenly start to sweat, or your skin gets clammy.  You feel sick to your stomach (nauseous).  You throw up (vomit).  You suddenly feel lightheaded or dizzy.  You feel very weak or tired.  Your heart starts to beat fast, or it feels like it is skipping beats.  These symptoms may be an emergency. Do not wait to see if the symptoms will go away. Get medical help right away. Call your local emergency services (911 in the [place] Do not drive yourself to the hospital.    Summary  Chest pain can be caused by many different conditions. The cause may be serious and need treatment right away. If you have chest pain, see your doctor right away.  Follow your doctor's instructions for taking medicines and making lifestyle changes.  Keep all follow-up visits as told by your doctor. This includes visits for any further testing if your chest pain does not go away.  Be sure to know the signs that show that your condition has become worse. Get help right away if you have these symptoms.

## 2025-06-25 NOTE — ED ADULT TRIAGE NOTE - CHIEF COMPLAINT QUOTE
Spanish Speaking translated daughter David Tijerina  Right ear pain x 5 days, general weakness, chest pain started x 3 days, lower abd pain started today

## 2025-06-25 NOTE — ED PROVIDER NOTE - CLINICAL SUMMARY MEDICAL DECISION MAKING FREE TEXT BOX
Assessment and Plan:    - Problem 1: Ear Infection    - Assessment/Plan: I assess that the patient has an ear infection, likely otitis externa, based on the 5-day history of ear pain, visible drainage, and the inflamed appearance of the external auditory canal on examination.      - Prescribe antibiotic ear drops       - Provide patient education on proper use of ear drops and ear care     - Problem 2: Chest Pain    - Assessment/Plan: The patient's chest pain is concerning, given its long-standing nature (2-3 years) and recent exacerbation. While the pain does not appear to be exertional, further evaluation is necessary to rule out cardiac causes.      - Order an EKG to assess for any cardiac abnormalities       - Order a chest X-ray to evaluate for any pulmonary or cardiac abnormalities       - Order blood work including CBC, BMP, and cardiac enzymes (e.g., Troponin)       - Consider further cardiac workup based on initial test results     - Problem 3: Lower Abdominal Pain    - Assessment/Plan: The etiology of the patient's lower abdominal pain is unclear at this time. While the physical examination did not reveal any tenderness,        - Include abdominal-specific tests in the blood work (e.g., lipase, liver function tests)       - Consider abdominal imaging if blood work is abnormal otherwise refer to PCP for further evaluation.      - Reassess after initial test results are available

## 2025-06-25 NOTE — ED PROVIDER NOTE - OBJECTIVE STATEMENT
Attending note (Black):   - History of Present Illness: The patient presents with multiple complaints. She has been experiencing ear pain for 5 days, with noticeable drainage from the ear. The chest pain has been present for 3 days, but upon further questioning, it was revealed that she has had intermittent chest pain for 2-3 years. The patient reports that the chest pain occurs when she eats rice and sometimes when she moves her right hand. She also complains of pain in the lower abdomen, though the duration of this symptom is unclear. The patient denies any exertional component to her chest pain.

## 2025-06-25 NOTE — ED ADULT NURSE NOTE - CHIEF COMPLAINT QUOTE
Italian Speaking translated daughter David Tijerina  Right ear pain x 5 days, general weakness, chest pain started x 3 days, lower abd pain started today

## 2025-06-25 NOTE — ED PROVIDER NOTE - PHYSICAL EXAMINATION
On Physical Exam:  General: well appearing, in NAD, speaking clearly in full sentences and without difficulty; cooperative with exam  HEENT: PERRL, MMM;   -R ear with external auditory canal inflammation and erythema, TM intact (partially visualized) no pus behind TM; no mastoid tenderness  -L ear: normal external auditory canal and TM  Neck: no neck tenderness, no nuchal rigidity  Cardiac: normal s1, s2; RRR; no MGR  Lungs: CTABL  Abdomen: soft nontender/nondistended  : no bladder tenderness or distension  Skin: intact, no rash  Extremities: no peripheral edema, no gross deformities

## 2025-06-25 NOTE — ED PROVIDER NOTE - PROGRESS NOTE DETAILS
JASPER Gramajo: Patient received in results waiting at time of disposition. Patient is AOx3, non-toxic appearing. Patient is a 54-year-old female presents to the ER with complaints of weakness, also ear pain for 5 days, lower abdominal pain for unknown amount of time, also with chest pain for 3 years.  Patient denies fever, chills, N/V/D, difficulty breathing.  Patient seen and evaluated by intake MD Cleaning.  And results waiting PE with abdomen is soft, nondistended, nontender to palpation, lungs CTA B/L, heart RRR, right ear with internal canal inflammation, + exudate, partially visualized TM, + TTP. No external ear erythema/edema/calor.   Labs obtained which were WNL, nonactionable.  Chest x-ray negative for acute findings.  All results discussed with patient and daughter, both parties provided time to ask questions which were answered at the bedside by me.  Will discharge patient home with Rx for new amoxicillin and ofloxacin eardrops. Patient instructed to follow-up with her primary care doctor, will also provide patient with cardiology follow-up.  Patient provided strict return precautions, endorses understanding.